# Patient Record
Sex: MALE | URBAN - METROPOLITAN AREA
[De-identification: names, ages, dates, MRNs, and addresses within clinical notes are randomized per-mention and may not be internally consistent; named-entity substitution may affect disease eponyms.]

---

## 2012-02-15 LAB — HBA1C MFR BLD: 7.7 % (ref 4.3–6.1)

## 2012-05-21 LAB — HBA1C MFR BLD: 6.9 % (ref 4.3–6.1)

## 2012-08-27 LAB — HBA1C MFR BLD: 7.7 % (ref 4.3–6.1)

## 2012-10-30 LAB
CHOLEST SERPL-MCNC: 170 MG/DL (ref 0–199)
HBA1C MFR BLD: 7.4 % (ref 4.3–6.1)
HDLC SERPL-MCNC: 58 MG/DL
LDLC SERPL CALC-MCNC: 98 MG/DL (ref 0–129)
NONHDLC SERPL-MCNC: 112 MG/DL
TRIGL SERPL-MCNC: 68 MG/DL (ref 0–149)

## 2013-01-09 LAB — HBA1C MFR BLD: 7.7 % (ref 4.3–6.1)

## 2013-07-09 LAB — HBA1C MFR BLD: 6.7 % (ref 4.3–6.1)

## 2014-11-19 LAB — HBA1C MFR BLD: 7.1 % (ref 4.3–5.6)

## 2015-07-29 LAB — HBA1C MFR BLD: 6.6 % (ref 4.3–5.6)

## 2017-01-06 ENCOUNTER — TRANSFERRED RECORDS (OUTPATIENT)
Dept: HEALTH INFORMATION MANAGEMENT | Facility: CLINIC | Age: 77
End: 2017-01-06

## 2017-01-06 LAB
CHOLEST SERPL-MCNC: 156 MG/DL (ref 0–199)
HBA1C MFR BLD: 8.1 % (ref 4.3–5.6)
HDLC SERPL-MCNC: 56 MG/DL
LDLC SERPL CALC-MCNC: 90 MG/DL (ref 0–129)
NONHDLC SERPL-MCNC: 100 MG/DL
TRIGL SERPL-MCNC: 52 MG/DL (ref 0–149)

## 2017-04-20 ENCOUNTER — TRANSFERRED RECORDS (OUTPATIENT)
Dept: HEALTH INFORMATION MANAGEMENT | Facility: CLINIC | Age: 77
End: 2017-04-20

## 2017-04-20 LAB — HBA1C MFR BLD: 7.4 % (ref 4.3–5.6)

## 2017-04-26 ENCOUNTER — OFFICE VISIT (OUTPATIENT)
Dept: PHARMACY | Facility: CLINIC | Age: 77
End: 2017-04-26
Payer: COMMERCIAL

## 2017-04-26 VITALS
HEART RATE: 55 BPM | DIASTOLIC BLOOD PRESSURE: 68 MMHG | WEIGHT: 176 LBS | SYSTOLIC BLOOD PRESSURE: 137 MMHG | BODY MASS INDEX: 26.76 KG/M2

## 2017-04-26 DIAGNOSIS — E11.9 TYPE 2 DIABETES MELLITUS WITHOUT COMPLICATION, WITHOUT LONG-TERM CURRENT USE OF INSULIN (H): Primary | ICD-10-CM

## 2017-04-26 DIAGNOSIS — B00.9 HSV (HERPES SIMPLEX VIRUS) INFECTION: ICD-10-CM

## 2017-04-26 DIAGNOSIS — N18.30 CHRONIC KIDNEY DISEASE, STAGE III (MODERATE) (H): ICD-10-CM

## 2017-04-26 DIAGNOSIS — N52.9 ERECTILE DYSFUNCTION, UNSPECIFIED ERECTILE DYSFUNCTION TYPE: ICD-10-CM

## 2017-04-26 DIAGNOSIS — I10 ESSENTIAL HYPERTENSION WITH GOAL BLOOD PRESSURE LESS THAN 140/90: ICD-10-CM

## 2017-04-26 DIAGNOSIS — K21.9 GASTROESOPHAGEAL REFLUX DISEASE WITHOUT ESOPHAGITIS: ICD-10-CM

## 2017-04-26 DIAGNOSIS — E78.5 HYPERLIPIDEMIA LDL GOAL <100: ICD-10-CM

## 2017-04-26 PROCEDURE — 99607 MTMS BY PHARM ADDL 15 MIN: CPT | Performed by: PHARMACIST

## 2017-04-26 PROCEDURE — 99605 MTMS BY PHARM NP 15 MIN: CPT | Performed by: PHARMACIST

## 2017-04-26 RX ORDER — CALCITRIOL 0.25 UG/1
0.25 CAPSULE, LIQUID FILLED ORAL
COMMUNITY
Start: 2017-04-12 | End: 2018-04-12

## 2017-04-26 NOTE — PROGRESS NOTES
SUBJECTIVE/OBJECTIVE:                                                    Derrick Dela Cruz is a 76 year old male coming in for a follow-up visit for Medication Therapy Management.  He was referred to me from self.   First visit in 2017    Chief Complaint: Follow up from our visit on 16.  Following up DM2 and HTN.    Personal Healthcare Goals: To exercise more to improve his blood sugars  Tobacco: No tobacco use   Alcohol: Less than 1 beverage / month    Medication Adherence: no issues reported    Diabetes:  Pt currently taking tradjenta 2.5 mg, glipizide 2.5 mg, cinnamon 500 mg capsules.  Pt is not experiencing side effects  SMBG: two times daily.   Ranges (based on glucometer readings):   FB-118  6pm: 120-130  Symptoms of low blood sugar? shaky. Frequency of hypoglycemia? Weekly when he is physically active  Recent symptoms of high blood sugar? none  Eye exam: up to date  Foot exam: no concerns  Microalbumin is not < 30 mg/g.  Pt is taking an ACEi/ARB.  Aspirin: Taking 81mg daily and denies side effects  Diet/Exercise: started to exercise daily     CKD:  Followed by Nephology.  Most recent labs Scr 1.4, GFR 49 which is improved.  Recently starting calcitriol for elevated PTH. No concerns at this time.    Hypertension: Current medications include amlodipine 5mg daily, benazapril 40 mg daily.  Patient does self-monitor BP. Home BP monitoring in range of 120's systolic over 80's diastolic.  Patient reports no current medication side effects.    GERD: Current medications include: Zantac (ranitidine) 75 mg at bedtime and tums as needed. Patient feels that current regimen is effective.    Hyperlipidemia: Currently taking simvastatin 20 mg daily.  No side effects reported.      Cold Sores:  Lysine and valacyclovir for treatment of cold sores.  Avoiding peanuts and chocolate to reduce risk.      Erectile Dysfunction: now taking cialis 5 mg daily.  He is pleased with effectiveness but has to order it from [facility]  due to lack of coverage.      General Pain: Uses APAP 1000 mg as needed prior to exercise and for aches and pains. Feels it is adequate at controlling pain.     Immunizations:  Patient reports having a flu shot this year.  He thinks he might be due for a pneumonia booster.      Current labs include:  BP Readings from Last 3 Encounters:   12/21/16 111/63   08/22/16 123/64   05/02/16 122/69     There were no vitals taken for this visit.    A1C      7.0   6/21/2016.    CHOL      153   1/21/2016  TRIG       60   1/21/2016  HDL       52   1/21/2016  LDL       89   1/21/2016    ASSESSMENT:                                                    Current medications were reviewed today.    Medicare Part D topics discussed:Medications reviewed-no issues identified or changes needed      Medication Adherence: no issues identified    Diabetes: Stable. Patient is meeting A1c goal of < 8%.      CKD: Follow-up with Nephrology as planned    Hypertension: Stable. Patient is meeting BP goal of < 140/90mmHg. No side effects reported.  Patient is due to see nephrology. Continue current therapy.      GERD: Stable. Patient feels symptoms are much improved with use of ranitidine daily and tums as needed.  At appropriate dose for kidney function.  Continue current therapy.      Cold Sores:  Stable    Hyperlipidemia: Stable. Pt is on moderate intensity statin which is indicated based on 2013 ACC/AHA guidelines for lipid management for patients of his approximate age and risk.  Continue current therapy.      Erectile dysfunction: Stable.       PLAN:                                                      1) Continue current regimen    I spent 45 minutes with this patient today.  An additional 15 minutes was spent creating a MAP.  All changes were made via collaborative practice agreement with Unknown, Doctor. A copy of the visit note was provided to the patient's primary care provider.     Will follow up in 5-6 months    The patient was given a  summary of these recommendations as an after visit summary.    Krista Mendoza , Pharm D  [phone] (phone)  [phone] (pager)  Medication Therapy Management Pharmacist

## 2017-04-26 NOTE — MR AVS SNAPSHOT
After Visit Summary   4/26/2017    Derrick Dela Cruz    MRN: [id number]           Patient Information     Date Of Birth          1940        Visit Information        Provider Department      4/26/2017 1:30 PM Krista Mendoza, [facility] MTM        Care Instructions    Recommendations from today's MTM visit:                                                    MTM (medication therapy management) is a service provided by a clinical pharmacist designed to help you get the most of out of your medicines.   Today we reviewed what your medicines are for, how to know if they are working, that your medicines are safe and how to make your medicine regimen as easy as possible.     1. Continue current regimen    2.  Calcium with diet (cheese, sour cream, green leafy veggies) and TUMS      Next MTM visit: 5-6 months    To schedule another MTM appointment, please call the clinic directly or you may call the MTM scheduling line at [phone] or toll-free at [phone].     My Clinical Pharmacist's contact information:                                                      It was a pleasure seeing you today!  Please feel free to contact me with any questions or concerns you have.      Krista Mendoza , Pharm D  [phone] (phone)  [phone] (pager)  Medication Therapy Management Pharmacist     You may receive a survey about the MTM services you received.  I would appreciate your feedback to help me serve you better in the future. Please fill it out and return it when you can. Your comments will be anonymous.      My healthcare goals:                                                      Healthcare Goals for Patients with Diabetes:     Home Monitoring of Blood Sugar:  Morning Pre-meal: 80-130mg/dl  2-Hours After a Meal:  (150)mg/dl       Blood Pressure: Less than 140/90 mm/hg     Hemoglobin A1C: Less than 7.0%      This is a 3 month average of your blood sugars. We should do this test every 3 to  "6 months depending on your diabetes control.      Goals for Cholesterol Levels:  Total Cholesterol: Less than 200 mg/dl  Triglycerides: Less than 150 mg/dl  LDL: Less than 100mg/dl (less than 70 mg/dl with heart disease)  HDL: Above 40 mg/dl (or as high as we can)      We should check your cholesterol and liver function at least every year or 3 months after a change in dose or medication.     By being more aware of your goals for your healthcare, you can take a more active role in managing your medications and lifestyle changes. We all need to work together to help you get the best healthcare.                Follow-ups after your visit        Who to contact     If you have questions or need follow up information about today's clinic visit or your schedule please contact [facility] directly at [phone].  Normal or non-critical lab and imaging results will be communicated to you by [employer], letter or phone within 4 business days after the clinic has received the results. If you do not hear from us within 7 days, please contact the clinic through [employer] or phone. If you have a critical or abnormal lab result, we will notify you by phone as soon as possible.  Submit refill requests through [employer] or call your pharmacy and they will forward the refill request to us. Please allow 3 business days for your refill to be completed.          Additional Information About Your Visit        [employer] Information     [employer] lets you send messages to your doctor, view your test results, renew your prescriptions, schedule appointments and more. To sign up, go to [email or web address] . Click on \"Log in\" on the left side of the screen, which will take you to the Welcome page. Then click on \"Sign up Now\" on the right side of the page.     You will be asked to enter the access code listed below, as well as some personal information. Please follow the directions to create your username and password.     Your access code " is: [email or web address]  Expires: 2017  1:58 PM     Your access code will  in 90 days. If you need help or a new code, please call your [place] clinic or [phone].        Care EveryWhere ID     This is your Care EveryWhere ID. This could be used by other organizations to access your [place] medical records  [id number]        Your Vitals Were     Pulse BMI (Body Mass Index)                55 26.76 kg/m2           Blood Pressure from Last 3 Encounters:   17 137/68   16 111/63   16 123/64    Weight from Last 3 Encounters:   17 176 lb (79.8 kg)   16 172 lb (78 kg)   16 170 lb (77.1 kg)              Today, you had the following     No orders found for display       Primary Care Provider    Doctor Unknown, MD       No address on file        Thank you!     Thank you for choosing [facility]  for your care. Our goal is always to provide you with excellent care. Hearing back from our patients is one way we can continue to improve our services. Please take a few minutes to complete the written survey that you may receive in the mail after your visit with us. Thank you!             Your Updated Medication List - Protect others around you: Learn how to safely use, store and throw away your medicines at www.disposemymeds.org.          This list is accurate as of: 17  1:58 PM.  Always use your most recent med list.                   Brand Name Dispense Instructions for use    acetaminophen 500 MG tablet    TYLENOL     Take 1-2 tablets by mouth as needed.       amLODIPine 5 MG tablet    NORVASC     Take 5 mg by mouth daily       aspirin 81 MG tablet      1 TABLET DAILY       benazepril 40 MG tablet    LOTENSIN     Take 40 mg by mouth daily       calcitRIOL 0.25 MCG capsule    ROCALTROL     Take 0.25 mcg by mouth       cinnamon 500 MG Caps      Take 1 capsule by mouth daily       furosemide 20 MG tablet    LASIX     Take 1 tablet by mouth daily.       glipiZIDE 2.5 MG 24  hr tablet    GLUCOTROL XL     Take 2.5 mg by mouth daily       linagliptin 5 MG Tabs tablet    TRADJENTA     Take 2.5 mg by mouth daily       Lysine 1000 MG Tabs      Take 1,000 mg by mouth as needed (as needed for canker sores)       Melatonin 2.5 MG Caps      Take 1 capsule by mouth daily       ranitidine 150 MG tablet    ZANTAC     Take 75 mg by mouth At Bedtime       simvastatin 20 MG tablet    ZOCOR     Take 20 mg by mouth At Bedtime       tadalafil 5 MG tablet    CIALIS    30 tablet    Take 1 tablet (5 mg) by mouth daily Never use with nitroglycerin, terazosin or doxazosin.       TRUETEST test strip   Generic drug:  blood glucose monitoring      daily       valACYclovir 500 MG tablet    VALTREX    90 tablet    Take 1 tablet (500 mg) by mouth daily

## 2017-04-26 NOTE — PATIENT INSTRUCTIONS
Recommendations from today's MTM visit:                                                    MTM (medication therapy management) is a service provided by a clinical pharmacist designed to help you get the most of out of your medicines.   Today we reviewed what your medicines are for, how to know if they are working, that your medicines are safe and how to make your medicine regimen as easy as possible.     1. Continue current regimen    2.  Calcium with diet (cheese, sour cream, green leafy veggies) and TUMS      Next MTM visit: 5-6 months    To schedule another MTM appointment, please call the clinic directly or you may call the MTM scheduling line at [phone] or toll-free at [phone].     My Clinical Pharmacist's contact information:                                                      It was a pleasure seeing you today!  Please feel free to contact me with any questions or concerns you have.      Krista Mendoza , Pharm D  [phone] (phone)  [phone] (pager)  Medication Therapy Management Pharmacist     You may receive a survey about the MTM services you received.  I would appreciate your feedback to help me serve you better in the future. Please fill it out and return it when you can. Your comments will be anonymous.      My healthcare goals:                                                      Healthcare Goals for Patients with Diabetes:     Home Monitoring of Blood Sugar:  Morning Pre-meal: 80-130mg/dl  2-Hours After a Meal:  (150)mg/dl       Blood Pressure: Less than 140/90 mm/hg     Hemoglobin A1C: Less than 7.0%      This is a 3 month average of your blood sugars. We should do this test every 3 to 6 months depending on your diabetes control.      Goals for Cholesterol Levels:  Total Cholesterol: Less than 200 mg/dl  Triglycerides: Less than 150 mg/dl  LDL: Less than 100mg/dl (less than 70 mg/dl with heart disease)  HDL: Above 40 mg/dl (or as high as we can)      We should check your  cholesterol and liver function at least every year or 3 months after a change in dose or medication.     By being more aware of your goals for your healthcare, you can take a more active role in managing your medications and lifestyle changes. We all need to work together to help you get the best healthcare.

## 2017-04-26 NOTE — LETTER
"     [facility]     Date: 2017    Derrick Dela Cruz  [address]  [facility] [address]    Dear Mr. Dela Cruz,    Thank you for talking with me on 2017 about your health and medications. Medicare s MTM (Medication Therapy Management) program helps you understand your medications and use them safely.      This letter includes an action plan (Medication Action Plan) and medication list (Personal Medication List). The action plan has steps you should take to help you get the best results from your medications. The medication list will help you keep track of your medications and how to use them the right way.       Have your action plan and medication list with you when you talk with your doctors, pharmacists, and other healthcare providers in your care team.     Ask your doctors, pharmacists, and other healthcare providers to update the action plan and medication list at every visit.     Take your medication list with you if you go to the hospital or emergency room.     Give a copy of the action plan and medication list to your family or caregivers.     If you want to talk about this letter or any of the papers with it, please call   [phone].   We look forward to working with you, your doctors, and other healthcare providers to help you stay healthy.     Sincerely,    Krista Mendoza      Enclosed: Medication Action Plan and Personal Medication List    MEDICATION ACTION PLAN FOR Derrick Dela Cruz,  1940     This action plan will help you get the best results from your medications if you:   1. Read \"What we talked about.\"   2. Take the steps listed in the \"What I need to do\" boxes.   3. Fill in \"What I did and when I did it.\"   4. Fill in \"My follow-up plan\" and \"Questions I want to ask.\"     Have this action plan with you when you talk with your doctors, pharmacists, and other healthcare providers in your care team. Share this with your family or caregivers too.  DATE PREPARED: " 2017  What we talked about:  Medication review                                           What I need to do: continue current regimen       What I did and when I did it:                                              My follow-up plan:                 Questions I want to ask:              If you have any questions about your action plan, call Krista Mendoza, Phone: [phone] , Monday-Friday 8-4:30pm.           MEDICATION LIST FOR Derrick Dela CruzSALAS 1940     This medication list was made for you after we talked. We also used information from your doctor's chart.      Use blank rows to add new medications. Then fill in the dates you started using them.    Cross out medications when you no longer use them. Then write the date and why you stopped using them.    Ask your doctors, pharmacists, and other healthcare providers to update this list at every visit. Keep this list up-to-date with:       Prescription medications    Over the counter drugs     Herbals    Vitamins    Minerals      If you go to the hospital or emergency room, take this list with you. Share this with your family or caregivers too.     DATE PREPARED: 2017  Allergies or side effects: Codeine sulfate; Hydrochlorothiazide; and Iodine-131     Medication:  ACETAMINOPHEN 500 MG PO TABS      How I use it:  Take 1-2 tablets by mouth as needed.       Why I use it:  Pain    Prescriber:  Patient Reported      Date I started using it:       Date I stopped using it:         Why I stopped using it:            Medication:  AMLODIPINE BESYLATE 5 MG PO TABS      How I use it:  Take 5 mg by mouth daily      Why I use it:  Blood Pressure    Prescriber:  Patient Reported      Date I started using it:       Date I stopped using it:         Why I stopped using it:            Medication:  ASPIRIN 81 MG PO TABS      How I use it:  1 TABLET DAILY      Why I use it:  Heart Health    Prescriber:  Patient Reported      Date I started using it:       Date I  stopped using it:         Why I stopped using it:            Medication:  BENAZEPRIL HCL 40 MG PO TABS      How I use it:  Take 40 mg by mouth daily      Why I use it:  Blood Pressure    Prescriber:  Patient Reported      Date I started using it:       Date I stopped using it:         Why I stopped using it:            Medication:  CALCITRIOL 0.25 MCG PO CAPS      How I use it:  Take 0.25 mcg by mouth      Why I use it:  Elevated PTH    Prescriber:  Patient Reported      Date I started using it:       Date I stopped using it:         Why I stopped using it:            Medication:  CINNAMON 500 MG PO CAPS      How I use it:  Take 1 capsule by mouth daily      Why I use it:  Blood sugars    Prescriber:  Patient Reported      Date I started using it:       Date I stopped using it:         Why I stopped using it:            Medication:  FUROSEMIDE 20 MG PO TABS      How I use it:  Take 1 tablet by mouth daily.      Why I use it:  Edema    Prescriber:  Patient Reported      Date I started using it:       Date I stopped using it:         Why I stopped using it:            Medication:  GLIPIZIDE ER 2.5 MG PO TB24      How I use it:  Take 2.5 mg by mouth daily      Why I use it:  Diabetes    Prescriber:  Patient Reported      Date I started using it:       Date I stopped using it:         Why I stopped using it:            Medication:  LINAGLIPTIN 5 MG PO TABS      How I use it:  Take 2.5 mg by mouth daily       Why I use it:  Diabetes    Prescriber:  Patient Reported      Date I started using it:       Date I stopped using it:         Why I stopped using it:            Medication:  LYSINE 1000 MG PO TABS      How I use it:  Take 1,000 mg by mouth as needed (as needed for canker sores)      Why I use it:  Cold Sores    Prescriber:  Patient Reported      Date I started using it:       Date I stopped using it:         Why I stopped using it:            Medication:  MELATONIN 2.5 MG PO CAPS      How I use it:  Take 1 capsule  by mouth daily      Why I use it:  Sleep    Prescriber:  Patient Reported      Date I started using it:       Date I stopped using it:         Why I stopped using it:            Medication:  RANITIDINE  MG PO TABS      How I use it:  Take 75 mg by mouth At Bedtime       Why I use it:  Heart Burn    Prescriber:  Patient Reported      Date I started using it:       Date I stopped using it:         Why I stopped using it:            Medication:  SIMVASTATIN 20 MG PO TABS      How I use it:  Take 20 mg by mouth At Bedtime      Why I use it:  Cholesterol    Prescriber:  Patient Reported      Date I started using it:       Date I stopped using it:         Why I stopped using it:            Medication:  TADALAFIL 5 MG PO TABS      How I use it:  Take 1 tablet (5 mg) by mouth daily Never use with nitroglycerin, terazosin or doxazosin.      Why I use it:  Erectile Dysfunction    Prescriber:  Provider Abstract      Date I started using it:       Date I stopped using it:         Why I stopped using it:            Medication:  VALACYCLOVIR  MG PO TABS      How I use it:  Take 1 tablet (500 mg) by mouth daily      Why I use it:  Cold Sores    Prescriber:         Date I started using it:       Date I stopped using it:         Why I stopped using it:            Medication:         How I use it:         Why I use it:      Prescriber:         Date I started using it:       Date I stopped using it:         Why I stopped using it:            Medication:         How I use it:         Why I use it:      Prescriber:         Date I started using it:       Date I stopped using it:         Why I stopped using it:            Medication:         How I use it:         Why I use it:      Prescriber:         Date I started using it:       Date I stopped using it:         Why I stopped using it:              Other Information:     If you have any questions about your action plan, call [phone].    According to the Paperwork  Reduction Act of 1995, no persons are required to respond to a collection of information unless it displays a valid OMB control number. The valid OMB number for this information collection is 8040-2069. The time required to complete this information collection is estimated to average 40 minutes per response, including the time to review instructions, searching existing data resources, gather the data needed, and complete and review the information collection. If you have any comments concerning the accuracy of the time estimate(s) or suggestions for improving this form, please write to: CMS, Attn: [employer] Reports Clearance Officer, [address] [place] [id number].

## 2017-09-25 ENCOUNTER — TELEPHONE (OUTPATIENT)
Dept: PHARMACY | Facility: CLINIC | Age: 77
End: 2017-09-25

## 2017-10-09 ENCOUNTER — OFFICE VISIT (OUTPATIENT)
Dept: PHARMACY | Facility: CLINIC | Age: 77
End: 2017-10-09
Payer: COMMERCIAL

## 2017-10-09 VITALS — WEIGHT: 176 LBS | SYSTOLIC BLOOD PRESSURE: 108 MMHG | DIASTOLIC BLOOD PRESSURE: 60 MMHG | BODY MASS INDEX: 26.76 KG/M2

## 2017-10-09 DIAGNOSIS — G47.00 INSOMNIA, UNSPECIFIED TYPE: ICD-10-CM

## 2017-10-09 DIAGNOSIS — I10 ESSENTIAL HYPERTENSION WITH GOAL BLOOD PRESSURE LESS THAN 140/90: ICD-10-CM

## 2017-10-09 DIAGNOSIS — G47.00 INSOMNIA: ICD-10-CM

## 2017-10-09 DIAGNOSIS — B00.9 HERPES SIMPLEX VIRUS INFECTION: ICD-10-CM

## 2017-10-09 DIAGNOSIS — E78.5 HYPERLIPIDEMIA LDL GOAL <100: ICD-10-CM

## 2017-10-09 DIAGNOSIS — K21.9 GASTROESOPHAGEAL REFLUX DISEASE WITHOUT ESOPHAGITIS: ICD-10-CM

## 2017-10-09 DIAGNOSIS — E11.9 TYPE 2 DIABETES MELLITUS WITHOUT COMPLICATION, WITHOUT LONG-TERM CURRENT USE OF INSULIN (H): Primary | ICD-10-CM

## 2017-10-09 DIAGNOSIS — K21.9 ESOPHAGEAL REFLUX: ICD-10-CM

## 2017-10-09 DIAGNOSIS — N18.30 CHRONIC KIDNEY DISEASE, STAGE III (MODERATE) (H): ICD-10-CM

## 2017-10-09 PROCEDURE — 99607 MTMS BY PHARM ADDL 15 MIN: CPT | Performed by: PHARMACIST

## 2017-10-09 PROCEDURE — 99606 MTMS BY PHARM EST 15 MIN: CPT | Performed by: PHARMACIST

## 2017-10-09 RX ORDER — CALCIUM CARBONATE 500 MG/1
1 TABLET, CHEWABLE ORAL DAILY PRN
COMMUNITY

## 2017-10-09 NOTE — MR AVS SNAPSHOT
After Visit Summary   10/9/2017    Derrick Dela Cruz    MRN: [id number]           Patient Information     Date Of Birth          1940        Visit Information        Provider Department      10/9/2017 9:30 AM Krista Mendoza, [facility]        Care Instructions    Recommendations from today's MTM visit:                                                        1. Maintain drinking adequate water daily.    2. Cramps- increasing water intake and massage/exercise before bed.  If the cramps get worse or more consistent you can talk to your doctors about checking electrolyte labs.      3.  Increase exercise to help with blood sugars.      Next MTM visit: 3 months    To schedule another MTM appointment, please call the clinic directly or you may call the MTM scheduling line at [phone] or toll-free at [phone].     My Clinical Pharmacist's contact information:                                                      It was a pleasure seeing you today!  Please feel free to contact me with any questions or concerns you have.      Krista Mendoza, Seg[place]      You may receive a survey about the MT services you received.  I would appreciate your feedback to help me serve you better in the future. Please fill it out and return it when you can. Your comments will be anonymous.                     Follow-ups after your visit        Who to contact     If you have questions or need follow up information about today's clinic visit or your schedule please contact [facility] directly at [phone].  Normal or non-critical lab and imaging results will be communicated to you by MyChart, letter or phone within 4 business days after the clinic has received the results. If you do not hear from us within 7 days, please contact the clinic through [employer] or phone. If you have a critical or abnormal lab result, we will notify you by phone as soon as possible.  Submit refill requests through [employer]  "or call your pharmacy and they will forward the refill request to us. Please allow 3 business days for your refill to be completed.          Additional Information About Your Visit        MyChart Information     [employer] lets you send messages to your doctor, view your test results, renew your prescriptions, schedule appointments and more. To sign up, go to [email or web address] . Click on \"Log in\" on the left side of the screen, which will take you to the Welcome page. Then click on \"Sign up Now\" on the right side of the page.     You will be asked to enter the access code listed below, as well as some personal information. Please follow the directions to create your username and password.     Your access code is: [email or web address]  Expires: 2018 10:06 AM     Your access code will  in 90 days. If you need help or a new code, please call your [facility] clinic or [phone].        Care EveryWhere ID     This is your Care EveryWhere ID. This could be used by other organizations to access your [facility] medical records  [id number]        Your Vitals Were     BMI (Body Mass Index)                   26.76 kg/m2            Blood Pressure from Last 3 Encounters:   10/09/17 108/60   17 137/68   16 111/63    Weight from Last 3 Encounters:   10/09/17 176 lb (79.8 kg)   17 176 lb (79.8 kg)   16 172 lb (78 kg)              Today, you had the following     No orders found for display       Primary Care Provider    None Specified       No primary provider on file.        Equal Access to Services     GIRISH ALEJANDRO : Hadii fredrick Rowland, waalineda whit, qaybta kaalnichol barnes. So [facility] [phone].    ATENCIÓN: Si habla español, tiene a aguilera disposición servicios gratuitos de asistencia lingüística. Llame al [phone].    We comply with applicable federal civil rights laws and [place] laws. We do not discriminate on the basis of race, color, " national origin, age, disability, sex, sexual orientation, or gender identity.            Thank you!     Thank you for choosing [facility]  for your care. Our goal is always to provide you with excellent care. Hearing back from our patients is one way we can continue to improve our services. Please take a few minutes to complete the written survey that you may receive in the mail after your visit with us. Thank you!             Your Updated Medication List - Protect others around you: Learn how to safely use, store and throw away your medicines at www.disposemymeds.org.          This list is accurate as of: 10/9/17 10:06 AM.  Always use your most recent med list.                   Brand Name Dispense Instructions for use Diagnosis    acetaminophen 500 MG tablet    TYLENOL     Take 1-2 tablets by mouth as needed.        amLODIPine 5 MG tablet    NORVASC     Take 5 mg by mouth daily        aspirin 81 MG tablet      1 TABLET DAILY        benazepril 40 MG tablet    LOTENSIN     Take 40 mg by mouth daily        calcitRIOL 0.25 MCG capsule    ROCALTROL     Take 0.25 mcg by mouth        cinnamon 500 MG Caps      Take 1 capsule by mouth 2 times daily        furosemide 20 MG tablet    LASIX     Take 1 tablet by mouth daily.        glipiZIDE 2.5 MG 24 hr tablet    GLUCOTROL XL     Take 2.5 mg by mouth daily        linagliptin 5 MG Tabs tablet    TRADJENTA     Take 5 mg by mouth daily        Lysine 1000 MG Tabs      Take 1,000 mg by mouth as needed (as needed for canker sores)        Melatonin 2.5 MG Caps      Take 1 capsule by mouth daily        ranitidine 150 MG tablet    ZANTAC     Take 75 mg by mouth At Bedtime        simvastatin 20 MG tablet    ZOCOR     Take 20 mg by mouth At Bedtime        tadalafil 5 MG tablet    CIALIS    30 tablet    Take 1 tablet (5 mg) by mouth daily Never use with nitroglycerin, terazosin or doxazosin.        TRUETEST test strip   Generic drug:  blood glucose monitoring      daily         valACYclovir 500 MG tablet    VALTREX    90 tablet    Take 1 tablet (500 mg) by mouth daily

## 2017-10-09 NOTE — PATIENT INSTRUCTIONS
Recommendations from today's MTM visit:                                                         1. Cramps- increasing water intake and massage/exercise before bed.  If the cramps get worse or more consistent you can talk to your doctors about checking electrolyte labs.       2.  Increase exercise to help with blood sugars     3. Insomnia - Avoid drinking too much water prior to going to bed to avoid having to get up during the night. If not able to go sleep right away, get out of bed and try reading 15- 20 minutes.     4. Discuss with PCP about getting annual flu shot.      Next MTM visit: 3 months    To schedule another MTM appointment, please call the clinic directly or you may call the MTM scheduling line at [phone] or toll-free at [phone].     My Clinical Pharmacist's contact information:                                                      It was a pleasure seeing you today!  Please feel free to contact me with any questions or concerns you have.      Jos Jeffries, Pharmacy Student    Krista Mendoza, PharmD      You may receive a survey about the MTM services you received.  I would appreciate your feedback to help me serve you better in the future. Please fill it out and return it when you can. Your comments will be anonymous.

## 2017-10-09 NOTE — PROGRESS NOTES
SUBJECTIVE/OBJECTIVE:                Derrick Dela Cruz is a 77 year old male coming in for a follow-up visit for Medication Therapy Management.  He was referred to me from Self.       Chief Complaint: Follow up from our visit on 4/26/17.    Personal Healthcare Goals: Exercise more and drink more water daily  Tobacco: No tobacco use    Alcohol: Less than 1 beverage / month    Medication Adherence: no issues reported    Diabetes:  Pt currently taking Glipizide XL 2.5 mg daily, Cinnamon 500 mg BID, Linagliptan 5 mg daily. Pt is not experiencing side effects.  SMBG: two times daily.   Ranges (from patient's glucose log): 100s- 170s  Patient is experiencing hypoglycemia. Frequency of hypoglycemia? Once in the last month (lows 70s). Symptoms of low blood sugar? shaky.   Recent symptoms of high blood sugar? None reported  Eye exam: due  Foot exam: no concern at this time  Microalbumin is not < 30 mg/g. Pt is taking an ACEi/ARB.  Aspirin: Taking 81mg daily and denies side effects  Diet/Exercise: Goes on walks every night with wife. Says he knows he should exercise more. Goes kayaking during the summer when able to.  Planning to get labs done later in October after returning from a trip, Meeting with nephrologist in Oct. Will have labs done prior to appointment.  Due for flu vaccine    Date FBG/ 2hours post Lunch/2hours post Dinner /2hours post    10/9 116 173     10/8 148 160     10/7 109 95     10/6 142 115     10/5 81 142     10/4 105 167              CKD:  Taking calcitriol 0.25 mcg daily.  No concerns with side effects since starting.  Scheduled with Nephrology for follow-up.  No recent labs.      Hypertension: Current medications include Benazepril 40 mg daily, Amlodipine 5 mg daily, Furosemide 20 mg daily.  Patient does self-monitor BP occasionally . Home BP monitoring in range of 120's systolic over 90's diastolic.  Patient reports no current medication side effects.  Pt reports having increasing muscle cramps on RLE  (calf) mostly at night recently.  Pt trying drink more water during the day.     Hyperlipidemia: Current therapy includes simvastatin 20 mg once daily.  Pt reports no significant myalgias or other side effects.     GERD: Current medications include: Zantac (ranitidine) 75 mg daily QHS, Tums 500 mg daily if needed. The patient does not have a history of GI bleed. Patient feels that current regimen is effective.    Insomnia: Pt currently taking Melatonin 2.5 mg daily. Report sometimes due to having to get up to go to the bathroom. Pt working on going to bed earlier, but will stay up until wife goes to sleep later at night around 11 pm. Wears chin strap to help reduce snoring.     Cold sores: Pt currently using Valacyclovir 500 mg and Lysine 1000 mg daily, pt reports no active sores at this time.     Current labs include:BP Readings from Last 3 Encounters:   04/26/17 137/68   12/21/16 111/63   08/22/16 123/64     Today's Vitals: /60  Wt 176 lb (79.8 kg)  BMI 26.76 kg/m2  Lab Results   Component Value Date    A1C 7.4 04/20/2017   .  Lab Results   Component Value Date    CHOL 156 01/06/2017     Lab Results   Component Value Date    TRIG 52 01/06/2017     Lab Results   Component Value Date    HDL 56 01/06/2017     Lab Results   Component Value Date    LDL 90 01/06/2017       Liver Function Studies - No results for input(s): PROTTOTAL, ALBUMIN, BILITOTAL, ALKPHOS, AST, ALT, BILIDIRECT in the last 61358 hours.          ASSESSMENT:              Current medications were reviewed today as discussed above.        Medication Adherence: no issues identified    Diabetes: Stable. Patient is meeting A1c goal of < 8%. Pt blood sugars vary based upon daily exercise no major concerns at this time.   Due for flu vaccine    Hypertension: Stable. Patient is meeting BP goal of < 140/90mmHg.     Hyperlipidemia: Stable. Pt is on moderate intensity statin which is indicated based on 2013 ACC/AHA guidelines for lipid management.       GERD: Stable.  Current treatment is effective.    Insomnia: Needs improvement. Discussed behavioral options of getting up for 15-20 minutes to read and maintaining a normal routine prior to going to sleep.   Pt would benefit from monitoring fluid intake later in the evening to avoid having to get up during the middle of the night.     Cold Sores:  Stable. No active infection.    CKD:  Pt to f/u with Nephrology as planned.  No recommended changes in medication dosing.     PLAN:                  1. Cramps- increasing water intake and massage/exercise before bed.  If the cramps get worse or more consistent you can talk to your doctors about checking electrolyte labs.      2.  Increase exercise to help with blood sugars    3. Insomnia - Avoid drinking too much water prior to going to bed to avoid having to get up during the night. If not able to go sleep right away, get out of bed and try reading 15- 20 minutes.    4. Discuss with PCP about getting annual flu shot. - pt got flu vaccine per [facility]    I spent 30 minutes with this patient today. All changes were made via collaborative practice agreement with No primary care provider on file.. A copy of the visit note was provided to the patient's primary care provider.     Will follow up in 3 months.    The patient was given a summary of these recommendations as an after visit summary.    Jos Jeffries, Pharmacy Student    Krista Mendoza , Pharm D  [phone] (phone)  [phone] (pager)  Medication Therapy Management Pharmacist

## 2017-10-26 LAB — HBA1C MFR BLD: 7.4 % (ref 4.3–5.6)

## 2018-01-15 ENCOUNTER — TELEPHONE (OUTPATIENT)
Dept: PHARMACY | Facility: CLINIC | Age: 78
End: 2018-01-15

## 2018-01-18 ENCOUNTER — OFFICE VISIT (OUTPATIENT)
Dept: PHARMACY | Facility: CLINIC | Age: 78
End: 2018-01-18
Payer: COMMERCIAL

## 2018-01-18 VITALS
BODY MASS INDEX: 26.3 KG/M2 | HEART RATE: 64 BPM | DIASTOLIC BLOOD PRESSURE: 68 MMHG | WEIGHT: 173 LBS | SYSTOLIC BLOOD PRESSURE: 116 MMHG

## 2018-01-18 DIAGNOSIS — R07.81 RIB PAIN: ICD-10-CM

## 2018-01-18 DIAGNOSIS — E78.5 HYPERLIPIDEMIA LDL GOAL <100: ICD-10-CM

## 2018-01-18 DIAGNOSIS — B00.9 HERPES SIMPLEX VIRUS INFECTION: ICD-10-CM

## 2018-01-18 DIAGNOSIS — N52.9 ERECTILE DYSFUNCTION, UNSPECIFIED ERECTILE DYSFUNCTION TYPE: ICD-10-CM

## 2018-01-18 DIAGNOSIS — K21.9 GASTROESOPHAGEAL REFLUX DISEASE WITHOUT ESOPHAGITIS: ICD-10-CM

## 2018-01-18 DIAGNOSIS — E11.9 TYPE 2 DIABETES MELLITUS WITHOUT COMPLICATION, WITHOUT LONG-TERM CURRENT USE OF INSULIN (H): Primary | ICD-10-CM

## 2018-01-18 DIAGNOSIS — G47.00 INSOMNIA, UNSPECIFIED TYPE: ICD-10-CM

## 2018-01-18 DIAGNOSIS — N18.30 CHRONIC KIDNEY DISEASE, STAGE III (MODERATE) (H): ICD-10-CM

## 2018-01-18 DIAGNOSIS — I10 ESSENTIAL HYPERTENSION WITH GOAL BLOOD PRESSURE LESS THAN 140/90: ICD-10-CM

## 2018-01-18 PROCEDURE — 99607 MTMS BY PHARM ADDL 15 MIN: CPT | Performed by: PHARMACIST

## 2018-01-18 PROCEDURE — 99605 MTMS BY PHARM NP 15 MIN: CPT | Performed by: PHARMACIST

## 2018-01-18 RX ORDER — SODIUM BICARBONATE 325 MG/1
325 TABLET ORAL 2 TIMES DAILY
COMMUNITY

## 2018-01-18 RX ORDER — ATORVASTATIN CALCIUM 10 MG/1
10 TABLET, FILM COATED ORAL DAILY
COMMUNITY

## 2018-01-18 NOTE — LETTER
"     [facility]     Date: 2018    Derrick Dela Cruz  [address]  [facility] [address]    Dear Mr. Dela Cruz,    Thank you for talking with me on 18 about your health and medications. Medicare s MTM (Medication Therapy Management) program helps you understand your medications and use them safely.      This letter includes an action plan (Medication Action Plan) and medication list (Personal Medication List). The action plan has steps you should take to help you get the best results from your medications. The medication list will help you keep track of your medications and how to use them the right way.       Have your action plan and medication list with you when you talk with your doctors, pharmacists, and other healthcare providers in your care team.     Ask your doctors, pharmacists, and other healthcare providers to update the action plan and medication list at every visit.     Take your medication list with you if you go to the hospital or emergency room.     Give a copy of the action plan and medication list to your family or caregivers.     If you want to talk about this letter or any of the papers with it, please call   [phone].   We look forward to working with you, your doctors, and other healthcare providers to help you stay healthy.     Sincerely,    Krista Mendoza      Enclosed: Medication Action Plan and Personal Medication List    MEDICATION ACTION PLAN FOR Derrick Dela Cruz,  1940     This action plan will help you get the best results from your medications if you:   1. Read \"What we talked about.\"   2. Take the steps listed in the \"What I need to do\" boxes.   3. Fill in \"What I did and when I did it.\"   4. Fill in \"My follow-up plan\" and \"Questions I want to ask.\"     Have this action plan with you when you talk with your doctors, pharmacists, and other healthcare providers in your care team. Share this with your family or caregivers too.  DATE PREPARED: " 2018  What we talked about: taking both glipizide tablets before breakfast                                                 What I need to do: take both glipizide tablets before breakfast       What I did and when I did it:                                              My follow-up plan:                 Questions I want to ask:              If you have any questions about your action plan, call Krista Mendoza, Phone: [phone] , Monday-Friday 8-4:30pm.           MEDICATION LIST FOR Derrick Dela CruzSALAS 1940     This medication list was made for you after we talked. We also used information from your doctor's chart.      Use blank rows to add new medications. Then fill in the dates you started using them.    Cross out medications when you no longer use them. Then write the date and why you stopped using them.    Ask your doctors, pharmacists, and other healthcare providers to update this list at every visit. Keep this list up-to-date with:       Prescription medications    Over the counter drugs     Herbals    Vitamins    Minerals      If you go to the hospital or emergency room, take this list with you. Share this with your family or caregivers too.     DATE PREPARED: 2018  Allergies or side effects: Codeine sulfate; Hydrochlorothiazide; and Iodine-131     Medication:  ACETAMINOPHEN 500 MG PO TABS      How I use it:  Take 1-2 tablets by mouth as needed.       Why I use it:  Pain    Prescriber:  Patient Reported      Date I started using it:       Date I stopped using it:         Why I stopped using it:            Medication:  AMLODIPINE BESYLATE 5 MG PO TABS      How I use it:  Take 5 mg by mouth daily      Why I use it:  Blood Pressure    Prescriber:  Patient Reported      Date I started using it:       Date I stopped using it:         Why I stopped using it:            Medication:  ASPIRIN 81 MG PO TABS      How I use it:  1 TABLET DAILY      Why I use it:  Heart Health    Prescriber:  Patient  Reported      Date I started using it:       Date I stopped using it:         Why I stopped using it:            Medication:  ATORVASTATIN CALCIUM 10 MG PO TABS      How I use it:  Take 10 mg by mouth daily      Why I use it:  Cholesterol    Prescriber:  Patient Reported      Date I started using it:       Date I stopped using it:         Why I stopped using it:            Medication:  BENAZEPRIL HCL 40 MG PO TABS      How I use it:  Take 40 mg by mouth daily      Why I use it:  Blood Pressure    Prescriber:  Patient Reported      Date I started using it:       Date I stopped using it:         Why I stopped using it:            Medication:  CALCITRIOL 0.25 MCG PO CAPS      How I use it:  Take 0.25 mcg by mouth      Why I use it:  Kidney    Prescriber:  Patient Reported      Date I started using it:       Date I stopped using it:         Why I stopped using it:            Medication:  CALCIUM CARBONATE ANTACID 500 MG PO CHEW      How I use it:  Take 1 chew tab by mouth daily as needed for heartburn      Why I use it:  Heartburn    Prescriber:  Patient Reported      Date I started using it:       Date I stopped using it:         Why I stopped using it:            Medication:  CINNAMON 500 MG PO CAPS      How I use it:  Take 1 capsule by mouth 2 times daily       Why I use it:  Blood Sugars    Prescriber:  Patient Reported      Date I started using it:       Date I stopped using it:         Why I stopped using it:            Medication:  FUROSEMIDE 20 MG PO TABS      How I use it:  Take 1 tablet by mouth daily.      Why I use it:  Swelling    Prescriber:  Patient Reported      Date I started using it:       Date I stopped using it:         Why I stopped using it:            Medication:  GLIPIZIDE ER 2.5 MG PO TB24      How I use it:  Take 5 mg by mouth daily       Why I use it:  Blood Sugars    Prescriber:  Patient Reported      Date I started using it:       Date I stopped using it:         Why I stopped using it:             Medication:  LINAGLIPTIN 5 MG PO TABS      How I use it:  Take 5 mg by mouth daily       Why I use it:  Blood Sugars    Prescriber:  Patient Reported      Date I started using it:       Date I stopped using it:         Why I stopped using it:            Medication:  LYSINE 1000 MG PO TABS      How I use it:  Take 1,000 mg by mouth as needed (as needed for canker sores)      Why I use it:  Cold Sores    Prescriber:  Patient Reported      Date I started using it:       Date I stopped using it:         Why I stopped using it:            Medication:  MELATONIN 2.5 MG PO CAPS      How I use it:  Take 1 capsule by mouth daily      Why I use it:      Prescriber:  Patient Reported      Date I started using it:       Date I stopped using it:         Why I stopped using it:            Medication:  RANITIDINE  MG PO TABS      How I use it:  Take 75 mg by mouth At Bedtime       Why I use it:      Prescriber:  Patient Reported      Date I started using it:       Date I stopped using it:         Why I stopped using it:            Medication:  SODIUM BICARBONATE 325 MG PO TABS      How I use it:  Take 325 mg by mouth 2 times daily      Why I use it:  Kidney    Prescriber:  Patient Reported      Date I started using it:       Date I stopped using it:         Why I stopped using it:            Medication:  TADALAFIL 5 MG PO TABS      How I use it:  Take 1 tablet (5 mg) by mouth daily Never use with nitroglycerin, terazosin or doxazosin.      Why I use it:  Erectile Dysfunction    Prescriber:  Provider Abstract      Date I started using it:       Date I stopped using it:         Why I stopped using it:            Medication:  TRUETEST TEST VI STRP      How I use it:  daily      Why I use it:  Diabetes    Prescriber:  Patient Reported      Date I started using it:       Date I stopped using it:         Why I stopped using it:            Medication:  VALACYCLOVIR  MG PO TABS      How I use it:  Take 1 tablet (500  mg) by mouth daily      Why I use it:  Herpes    Prescriber:         Date I started using it:       Date I stopped using it:         Why I stopped using it:            Medication:         How I use it:         Why I use it:      Prescriber:         Date I started using it:       Date I stopped using it:         Why I stopped using it:            Medication:         How I use it:         Why I use it:      Prescriber:         Date I started using it:       Date I stopped using it:         Why I stopped using it:            Medication:         How I use it:         Why I use it:      Prescriber:         Date I started using it:       Date I stopped using it:         Why I stopped using it:              Other Information:     If you have any questions about your action plan, call [phone].    According to the Paperwork Reduction Act of 1995, no persons are required to respond to a collection of information unless it displays a valid OMB control number. The valid OMB number for this information collection is 1898-8698. The time required to complete this information collection is estimated to average 40 minutes per response, including the time to review instructions, searching existing data resources, gather the data needed, and complete and review the information collection. If you have any comments concerning the accuracy of the time estimate(s) or suggestions for improving this form, please write to: CMS, Attn: [employer] Reports Clearance Officer, [address] [place] [address], [place], [place] [address].

## 2018-01-18 NOTE — MR AVS SNAPSHOT
After Visit Summary   1/18/2018    Derrick Dela Cruz    MRN: [id number]           Patient Information     Date Of Birth          1940        Visit Information        Provider Department      1/18/2018 9:30 AM Krista Mendoza, [facility]        Care Instructions    Recommendations from today's MT visit:                                                      1. Continue with current medication regimen.  If you get more than 2 blood sugars readings <80 in 1 week then call your doctor to discuss changes in your medications      Next MTM visit: 3 months    To schedule another MTM appointment, please call the clinic directly or you may call the MTM scheduling line at [phone] or toll-free at [phone].     My Clinical Pharmacist's contact information:                                                      It was a pleasure seeing you today!  Please feel free to contact me with any questions or concerns you have.      Krista Mendoza , Pharm D  [phone] (phone)  [phone] (pager)  Medication Therapy Management Pharmacist     You may receive a survey about the [facility] services you received.  I would appreciate your feedback to help me serve you better in the future. Please fill it out and return it when you can. Your comments will be anonymous.                      Follow-ups after your visit        Who to contact     If you have questions or need follow up information about today's clinic visit or your schedule please contact [facility] directly at [phone].  Normal or non-critical lab and imaging results will be communicated to you by MyChart, letter or phone within 4 business days after the clinic has received the results. If you do not hear from us within 7 days, please contact the clinic through MyChart or phone. If you have a critical or abnormal lab result, we will notify you by phone as soon as possible.  Submit refill requests through Tip or Skipt or call your pharmacy  "and they will forward the refill request to us. Please allow 3 business days for your refill to be completed.          Additional Information About Your Visit        MyChart Information     [employer] lets you send messages to your doctor, view your test results, renew your prescriptions, schedule appointments and more. To sign up, go to [email or web address] . Click on \"Log in\" on the left side of the screen, which will take you to the Welcome page. Then click on \"Sign up Now\" on the right side of the page.     You will be asked to enter the access code listed below, as well as some personal information. Please follow the directions to create your username and password.     Your access code is: [email or web address]  Expires: 2018 10:05 AM     Your access code will  in 90 days. If you need help or a new code, please call your [place] clinic or [phone].        Care EveryWhere ID     This is your Care EveryWhere ID. This could be used by other organizations to access your [place] medical records  [id number]        Your Vitals Were     Pulse BMI (Body Mass Index)                64 26.3 kg/m2           Blood Pressure from Last 3 Encounters:   18 116/68   10/09/17 108/60   17 137/68    Weight from Last 3 Encounters:   18 173 lb (78.5 kg)   10/09/17 176 lb (79.8 kg)   17 176 lb (79.8 kg)              Today, you had the following     No orders found for display         Today's Medication Changes          These changes are accurate as of: 18 10:05 AM.  If you have any questions, ask your nurse or doctor.               Stop taking these medicines if you haven't already. Please contact your care team if you have questions.     simvastatin 20 MG tablet   Commonly known as:  ZOCOR   Stopped by:  Krista Mendoza, [facility]                    Primary Care Provider Office Phone # Fax #    Yin Jaramillo [phone]       [facility] [address] [place] [address]   "      Equal Access to Services     [facility] : Hadii aad ku hadtabathashay Aimeeali, waalineda luqadaha, qaybta kacelianichol edmond. So [facility] [phone].    ATENCIÓN: Si habla español, tiene a aguilera disposición servicios gratuitos de asistencia lingüística. Llame al [phone].    We comply with applicable federal civil rights laws and [place] laws. We do not discriminate on the basis of race, color, national origin, age, disability, sex, sexual orientation, or gender identity.            Thank you!     Thank you for choosing [facility] [place]note 2]  for your care. Our goal is always to provide you with excellent care. Hearing back from our patients is one way we can continue to improve our services. Please take a few minutes to complete the written survey that you may receive in the mail after your visit with us. Thank you!             Your Updated Medication List - Protect others around you: Learn how to safely use, store and throw away your medicines at www.disposemymeds.org.          This list is accurate as of: 1/18/18 10:05 AM.  Always use your most recent med list.                   Brand Name Dispense Instructions for use Diagnosis    acetaminophen 500 MG tablet    TYLENOL     Take 1-2 tablets by mouth as needed.        amLODIPine 5 MG tablet    NORVASC     Take 5 mg by mouth daily        aspirin 81 MG tablet      1 TABLET DAILY        atorvastatin 10 MG tablet    LIPITOR     Take 10 mg by mouth daily        benazepril 40 MG tablet    LOTENSIN     Take 40 mg by mouth daily        calcitRIOL 0.25 MCG capsule    ROCALTROL     Take 0.25 mcg by mouth        calcium carbonate 500 MG chewable tablet    TUMS     Take 1 chew tab by mouth daily as needed for heartburn        cinnamon 500 MG Caps      Take 1 capsule by mouth 2 times daily        furosemide 20 MG tablet    LASIX     Take 1 tablet by mouth daily.        glipiZIDE 2.5 MG 24 hr tablet    GLUCOTROL XL     Take 5 mg by mouth  daily        linagliptin 5 MG Tabs tablet    TRADJENTA     Take 5 mg by mouth daily        Lysine 1000 MG Tabs      Take 1,000 mg by mouth as needed (as needed for canker sores)        Melatonin 2.5 MG Caps      Take 1 capsule by mouth daily        ranitidine 150 MG tablet    ZANTAC     Take 75 mg by mouth At Bedtime        sodium bicarbonate 325 MG tablet      Take 325 mg by mouth 2 times daily        tadalafil 5 MG tablet    CIALIS    30 tablet    Take 1 tablet (5 mg) by mouth daily Never use with nitroglycerin, terazosin or doxazosin.        TRUETEST test strip   Generic drug:  blood glucose monitoring      daily        valACYclovir 500 MG tablet    VALTREX    90 tablet    Take 1 tablet (500 mg) by mouth daily

## 2018-01-18 NOTE — PATIENT INSTRUCTIONS
Recommendations from today's MTM visit:                                                      1. Continue with current medication regimen.  If you get more than 2 blood sugars readings <80 in 1 week then call your doctor to discuss changes in your medications      Next MTM visit: 3 months    To schedule another MTM appointment, please call the clinic directly or you may call the MTM scheduling line at [phone] or toll-free at [phone].     My Clinical Pharmacist's contact information:                                                      It was a pleasure seeing you today!  Please feel free to contact me with any questions or concerns you have.      Krista Mendoza , Pharm D  [phone] (phone)  [phone] (pager)  Medication Therapy Management Pharmacist     You may receive a survey about the MTM services you received.  I would appreciate your feedback to help me serve you better in the future. Please fill it out and return it when you can. Your comments will be anonymous.

## 2018-01-18 NOTE — PROGRESS NOTES
SUBJECTIVE/OBJECTIVE:                Derrick Dela Cruz is a 77 year old male coming in for a follow-up visit for Medication Therapy Management.  He was referred to me from Self.   First visit in 2018    Chief Complaint: Follow up from our visit on 10/9/17    Personal Healthcare Goals: Exercise more and drink more water daily  Tobacco: No tobacco use    Alcohol: Less than 1 beverage / month    Medication Adherence: no issues reported    Diabetes:  Pt currently taking Glipizide XL 2.5 mg BID, Cinnamon 500 mg BID, Linagliptan 5 mg daily. Pt is not experiencing side effects.  SMBG: two times daily.   Ranges (from patient's glucose log): see below  Patient is experiencing hypoglycemia. Frequency of hypoglycemia? 1-2 in the last month (lows 70s). Symptoms of low blood sugar? shaky.   Recent symptoms of high blood sugar? None reported  Eye exam: scheduled  Foot exam: no concern at this time  Microalbumin is not < 30 mg/g. Pt is taking an ACEi/ARB.  Aspirin: Taking 81mg daily and denies side effects  Diet/Exercise: very active, walking and exercising 1-2 hours per day  Up to date on vaccines    Date FBG/ 2hours post Lunch/2hours post Dinner /2hours post    1/6 115  93    1/7 137  159    1/8 237  175    1/9 114  202    1/10 143  174    1/11 113  74    1/12 113      1/13 156      1/14 118      1/15 123      1/16 121  133    1/17 96  152    1/18 104                Hypertension: Current medications include Benazepril 40 mg daily, Amlodipine 5 mg daily, Furosemide 20 mg daily.  Patient does self-monitor BP occasionally . Home BP monitoring in range of 120's systolic over 90's diastolic.  Patient reports no current medication side effects.     Hyperlipidemia: Current therapy includes atorvastatin 10 mg once daily; changed from simvastatin due to muscle pains.  Continues to have hip pain when sitting.      GERD: Current medications include: Zantac (ranitidine) 75 mg daily with dinner, Tums 500 mg daily if needed. The patient does  not have a history of GI bleed. Patient feels that current regimen is effective.    Insomnia: Pt currently taking Melatonin 2.5 mg daily. Effective for him.  No concerns at this time.    Cold sores/Herpes: Pt currently using Valacyclovir 500 mg and Lysine 1000 mg daily, pt reports no active sores at this time. Has been questioned about dc daily antiviral.  He recalls stopping in the past and he did have flare.    ED:  Taking Cialis 5 mg daily.  Purchases from [employer].  No concerns with side effects.  Feels this has been effective.    Pain:  Fell in Dec on an snowy hill and broke two ribs.  Using APAP as needed.  No concerns at this time.    CKD:  Taking calcitriol 0.25 mcg daily and sodium bicarbonate 325 mg BID.   Labs will be rechecked in lab Feb.  Followed by Neurology.          Current labs include:  BP Readings from Last 3 Encounters:   10/09/17 108/60   04/26/17 137/68   12/21/16 111/63     Today's Vitals: There were no vitals taken for this visit.  Lab Results   Component Value Date    A1C 7.4 04/20/2017   .  Lab Results   Component Value Date    CHOL 156 01/06/2017     Lab Results   Component Value Date    TRIG 52 01/06/2017     Lab Results   Component Value Date    HDL 56 01/06/2017     Lab Results   Component Value Date    LDL 90 01/06/2017       Liver Function Studies - No results for input(s): PROTTOTAL, ALBUMIN, BILITOTAL, ALKPHOS, AST, ALT, BILIDIRECT in the last 95240 hours.          ASSESSMENT:              Current medications were reviewed today as discussed above.    Medicare Part D topics discussed:Timing of medication    Medication Adherence: no issues identified    Diabetes:  A1c at goal <8%.  He will continue current regimen, can dose both glipizide QAM for convenience.  Will report lows if frequency should increase.    Hypertension: stable    Hyperlipidemia: stable    GERD: stable    Insomnia: stable    Cold sores/Herpes: Reviewed risk vs benefit of staying on daily Valtrex; he would prefer to  continue.    ED:  stable    Pain:  stable    CKD:  Scr slightly improved with last labs.  He has upcoming labs scheduled.  Follows with Nephrology.     PLAN:                  1. Continue current regimen  2.  Okay to take both glipizide XL in the morning    I spent 30 minutes with this patient today. An additional 15 minutes was spent completing MAP today. A copy of the visit note was provided to the patient's primary care provider.     Will follow up in 3 months.    The patient was given a summary of these recommendations as an after visit summary.    Krista Mendoza , Pharm D  [phone] (phone)  [phone] (pager)  Medication Therapy Management Pharmacist

## 2018-04-16 ENCOUNTER — TELEPHONE (OUTPATIENT)
Dept: PHARMACY | Facility: CLINIC | Age: 78
End: 2018-04-16

## 2018-04-16 NOTE — TELEPHONE ENCOUNTER
Called pt to schedule MTM f/u appt - patient did not answer. Left Vm and CB# for scheduling.     Mis Islas, PharmD  Pharmaceutical Care Resident   Pager: [phone]

## 2018-04-26 ENCOUNTER — TRANSFERRED RECORDS (OUTPATIENT)
Dept: HEALTH INFORMATION MANAGEMENT | Facility: CLINIC | Age: 78
End: 2018-04-26

## 2018-04-26 LAB
CHOLEST SERPL-MCNC: 159 MG/DL (ref 0–199)
HBA1C MFR BLD: 8.3 % (ref 4.3–5.6)
HDLC SERPL-MCNC: 55 MG/DL
LDLC SERPL CALC-MCNC: 93 MG/DL (ref 0–129)
NONHDLC SERPL-MCNC: 104 MG/DL (ref 0–159)
TRIGL SERPL-MCNC: 57 MG/DL (ref 0–149)

## 2018-05-08 ENCOUNTER — OFFICE VISIT (OUTPATIENT)
Dept: PHARMACY | Facility: CLINIC | Age: 78
End: 2018-05-08
Payer: COMMERCIAL

## 2018-05-08 VITALS
BODY MASS INDEX: 26.75 KG/M2 | DIASTOLIC BLOOD PRESSURE: 67 MMHG | SYSTOLIC BLOOD PRESSURE: 123 MMHG | HEART RATE: 57 BPM | WEIGHT: 175.9 LBS

## 2018-05-08 DIAGNOSIS — I10 ESSENTIAL HYPERTENSION WITH GOAL BLOOD PRESSURE LESS THAN 140/90: ICD-10-CM

## 2018-05-08 DIAGNOSIS — N18.30 CHRONIC KIDNEY DISEASE, STAGE III (MODERATE) (H): ICD-10-CM

## 2018-05-08 DIAGNOSIS — E78.5 HYPERLIPIDEMIA LDL GOAL <100: ICD-10-CM

## 2018-05-08 DIAGNOSIS — R07.81 RIB PAIN: ICD-10-CM

## 2018-05-08 DIAGNOSIS — E11.9 TYPE 2 DIABETES MELLITUS WITHOUT COMPLICATION, WITHOUT LONG-TERM CURRENT USE OF INSULIN (H): Primary | ICD-10-CM

## 2018-05-08 PROCEDURE — 99606 MTMS BY PHARM EST 15 MIN: CPT | Performed by: PHARMACIST

## 2018-05-08 PROCEDURE — 99607 MTMS BY PHARM ADDL 15 MIN: CPT | Performed by: PHARMACIST
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
## 2018-05-08 NOTE — MR AVS SNAPSHOT
After Visit Summary   5/8/2018    Derrick Dela Cruz    MRN: [id number]           Patient Information     Date Of Birth          1940        Visit Information        Provider Department      5/8/2018 2:30 PM Krista Mendoza, [facility]        Care Instructions    Recommendations from today's MT visit:                                                      1. Check blood sugar 2-hrs after meal and bring readings to your doctor visit next week.  Your blood sugar goal 2-hours after should be <180 mg/dl.    Next MT visit: 3 months    To schedule another [facility] appointment, please call the clinic directly or you may call the [facility] scheduling line at [phone] or toll-free at [phone].     My Clinical Pharmacist's contact information:                                                      It was a pleasure seeing you today!  Please feel free to contact me with any questions or concerns you have.      Krista Mendoza , Pharm D  [phone] (phone)  [phone] (pager)  Medication Therapy Management Pharmacist                   Follow-ups after your visit        Who to contact     If you have questions or need follow up information about today's clinic visit or your schedule please contact [facility] directly at [phone].  Normal or non-critical lab and imaging results will be communicated to you by MyChart, letter or phone within 4 business days after the clinic has received the results. If you do not hear from us within 7 days, please contact the clinic through MyChart or phone. If you have a critical or abnormal lab result, we will notify you by phone as soon as possible.  Submit refill requests through [employer] or call your pharmacy and they will forward the refill request to us. Please allow 3 business days for your refill to be completed.          Additional Information About Your Visit        [employer] Information     [employer] lets you send messages to your doctor, view your  "test results, renew your prescriptions, schedule appointments and more. To sign up, go to [email or web address] . Click on \"Log in\" on the left side of the screen, which will take you to the Welcome page. Then click on \"Sign up Now\" on the right side of the page.     You will be asked to enter the access code listed below, as well as some personal information. Please follow the directions to create your username and password.     Your access code is: [email or web address]  Expires: 2018  3:03 PM     Your access code will  in 90 days. If you need help or a new code, please call your [place] clinic or [phone].        Care EveryWhere ID     This is your Care EveryWhere ID. This could be used by other organizations to access your [place] medical records  [id number]        Your Vitals Were     Pulse                   57            Blood Pressure from Last 3 Encounters:   18 123/67   18 116/68   10/09/17 108/60    Weight from Last 3 Encounters:   18 173 lb (78.5 kg)   10/09/17 176 lb (79.8 kg)   17 176 lb (79.8 kg)              Today, you had the following     No orders found for display       Primary Care Provider Office Phone # Fax #    Yin Jaramillo [phone]       [facility] [address] [place] [address]        Equal Access to Services     GIRISH ALEJANDRO AH: Hadii aad ku hadasho Soomaali, waaxda luqadaha, qaybta kaalmada adeegyada, nichol dan. So [facility] [phone].    ATENCIÓN: Si habla español, tiene a aguilera disposición servicios gratuitos de asistencia lingüística. Llame al [phone].    We comply with applicable federal civil rights laws and [place] laws. We do not discriminate on the basis of race, color, national origin, age, disability, sex, sexual orientation, or gender identity.            Thank you!     Thank you for choosing [facility]  for your care. Our goal is always to provide you with excellent " care. Hearing back from our patients is one way we can continue to improve our services. Please take a few minutes to complete the written survey that you may receive in the mail after your visit with us. Thank you!             Your Updated Medication List - Protect others around you: Learn how to safely use, store and throw away your medicines at www.disposemymeds.org.          This list is accurate as of 5/8/18  3:03 PM.  Always use your most recent med list.                   Brand Name Dispense Instructions for use Diagnosis    acetaminophen 500 MG tablet    TYLENOL     Take 1-2 tablets by mouth as needed.        amLODIPine 5 MG tablet    NORVASC     Take 5 mg by mouth daily        aspirin 81 MG tablet      1 TABLET DAILY        atorvastatin 10 MG tablet    LIPITOR     Take 10 mg by mouth daily        benazepril 40 MG tablet    LOTENSIN     Take 40 mg by mouth daily        calcitRIOL 0.25 MCG capsule    ROCALTROL     Take 0.25 mcg by mouth        calcium carbonate 500 MG chewable tablet    TUMS     Take 1 chew tab by mouth daily as needed for heartburn        cinnamon 500 MG Caps      Take 1 capsule by mouth 2 times daily        furosemide 20 MG tablet    LASIX     Take 1 tablet by mouth daily.        glipiZIDE 2.5 MG 24 hr tablet    GLUCOTROL XL     Take 5 mg by mouth daily        linagliptin 5 MG Tabs tablet    TRADJENTA     Take 5 mg by mouth daily        Lysine 1000 MG Tabs      Take 1,000 mg by mouth as needed (as needed for canker sores)        Melatonin 2.5 MG Caps      Take 1 capsule by mouth daily        ranitidine 150 MG tablet    ZANTAC     Take 75 mg by mouth At Bedtime        sodium bicarbonate 325 MG tablet      Take 325 mg by mouth 2 times daily        tadalafil 5 MG tablet    CIALIS    30 tablet    Take 1 tablet (5 mg) by mouth daily Never use with nitroglycerin, terazosin or doxazosin.        TRUETEST test strip   Generic drug:  blood glucose monitoring      daily        valACYclovir 500 MG  tablet    VALTREX    90 tablet    Take 1 tablet (500 mg) by mouth daily

## 2018-05-08 NOTE — PATIENT INSTRUCTIONS
Recommendations from today's MTM visit:                                                      1. Check blood sugar 2-hrs after meal and bring readings to your doctor visit next week.  Your blood sugar goal 2-hours after should be <180 mg/dl.    Next MTM visit: 3 months    To schedule another MTM appointment, please call the clinic directly or you may call the MTM scheduling line at [phone] or toll-free at [phone].     My Clinical Pharmacist's contact information:                                                      It was a pleasure seeing you today!  Please feel free to contact me with any questions or concerns you have.      Krista Mendoza , Pharm D  [phone] (phone)  [phone] (pager)  Medication Therapy Management Pharmacist

## 2018-08-02 ENCOUNTER — TELEPHONE (OUTPATIENT)
Dept: PHARMACY | Facility: CLINIC | Age: 78
End: 2018-08-02

## 2018-08-06 ENCOUNTER — OFFICE VISIT (OUTPATIENT)
Dept: PHARMACY | Facility: CLINIC | Age: 78
End: 2018-08-06
Payer: COMMERCIAL

## 2018-08-06 VITALS
BODY MASS INDEX: 25.62 KG/M2 | SYSTOLIC BLOOD PRESSURE: 123 MMHG | DIASTOLIC BLOOD PRESSURE: 72 MMHG | WEIGHT: 168.5 LBS | HEART RATE: 66 BPM

## 2018-08-06 DIAGNOSIS — L98.9 SKIN LESION: ICD-10-CM

## 2018-08-06 DIAGNOSIS — N18.30 CHRONIC KIDNEY DISEASE, STAGE III (MODERATE) (H): ICD-10-CM

## 2018-08-06 DIAGNOSIS — K21.9 GASTROESOPHAGEAL REFLUX DISEASE WITHOUT ESOPHAGITIS: ICD-10-CM

## 2018-08-06 DIAGNOSIS — I10 ESSENTIAL HYPERTENSION WITH GOAL BLOOD PRESSURE LESS THAN 140/90: ICD-10-CM

## 2018-08-06 DIAGNOSIS — G47.00 INSOMNIA, UNSPECIFIED TYPE: Primary | ICD-10-CM

## 2018-08-06 DIAGNOSIS — E78.5 HYPERLIPIDEMIA LDL GOAL <100: ICD-10-CM

## 2018-08-06 DIAGNOSIS — N52.9 ERECTILE DYSFUNCTION, UNSPECIFIED ERECTILE DYSFUNCTION TYPE: ICD-10-CM

## 2018-08-06 DIAGNOSIS — E11.9 TYPE 2 DIABETES MELLITUS WITHOUT COMPLICATION, WITHOUT LONG-TERM CURRENT USE OF INSULIN (H): ICD-10-CM

## 2018-08-06 DIAGNOSIS — M25.551 HIP PAIN, RIGHT: ICD-10-CM

## 2018-08-06 PROCEDURE — 99607 MTMS BY PHARM ADDL 15 MIN: CPT | Performed by: PHARMACIST

## 2018-08-06 PROCEDURE — 99606 MTMS BY PHARM EST 15 MIN: CPT | Performed by: PHARMACIST

## 2018-08-06 RX ORDER — CALCITRIOL 0.25 UG/1
0.25 CAPSULE, LIQUID FILLED ORAL DAILY
COMMUNITY
Start: 2018-07-20
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
## 2018-08-06 NOTE — PATIENT INSTRUCTIONS
Recommendations from today's MTM visit:                                                      1. Due for A1c lab      Next MTM visit: 3 months    To schedule another MTM appointment, please call the clinic directly or you may call the MTM scheduling line at [phone] or toll-free at [phone].     My Clinical Pharmacist's contact information:                                                      It was a pleasure seeing you today!  Please feel free to contact me with any questions or concerns you have.      Krista Mendoza , Pharm D  [phone] (phone)  [phone] (pager)  Medication Therapy Management Pharmacist     You may receive a survey about the MTM services you received.  I would appreciate your feedback to help me serve you better in the future. Please fill it out and return it when you can. Your comments will be anonymous.

## 2018-08-06 NOTE — MR AVS SNAPSHOT
After Visit Summary   8/6/2018    Derrick Dela Cruz    MRN: [id number]           Patient Information     Date Of Birth          1940        Visit Information        Provider Department      8/6/2018 1:00 PM Krista Mendoza, [facility]        Today's Diagnoses     Insomnia, unspecified type    -  1      Care Instructions    Recommendations from today's MTM visit:                                                      1. Due for A1c lab      Next MTM visit: 3 months    To schedule another MTM appointment, please call the clinic directly or you may call the MTM scheduling line at [phone] or toll-free at [phone].     My Clinical Pharmacist's contact information:                                                      It was a pleasure seeing you today!  Please feel free to contact me with any questions or concerns you have.      Krista Mendoza , Pharm D  [phone] (phone)  [phone] (pager)  Medication Therapy Management Pharmacist     You may receive a survey about the MTM services you received.  I would appreciate your feedback to help me serve you better in the future. Please fill it out and return it when you can. Your comments will be anonymous.                      Follow-ups after your visit        Who to contact     If you have questions or need follow up information about today's clinic visit or your schedule please contact [facility] directly at [phone].  Normal or non-critical lab and imaging results will be communicated to you by MyChart, letter or phone within 4 business days after the clinic has received the results. If you do not hear from us within 7 days, please contact the clinic through MyChart or phone. If you have a critical or abnormal lab result, we will notify you by phone as soon as possible.  Submit refill requests through [employer] or call your pharmacy and they will forward the refill request to us. Please allow 3 business days for your  refill to be completed.          Additional Information About Your Visit        Care EveryWhere ID     This is your Care EveryWhere ID. This could be used by other organizations to access your [place] medical records  [id number]        Your Vitals Were     Pulse BMI (Body Mass Index)                66 25.62 kg/m2           Blood Pressure from Last 3 Encounters:   08/06/18 123/72   05/08/18 123/67   01/18/18 116/68    Weight from Last 3 Encounters:   08/06/18 168 lb 8 oz (76.4 kg)   05/08/18 175 lb 14.4 oz (79.8 kg)   01/18/18 173 lb (78.5 kg)              Today, you had the following     No orders found for display         Today's Medication Changes          These changes are accurate as of 8/6/18  1:31 PM.  If you have any questions, ask your nurse or doctor.               These medicines have changed or have updated prescriptions.        Dose/Directions    melatonin 5 MG Caps   This may have changed:    - medication strength  - how much to take   Used for:  Insomnia, unspecified type        Dose:  2 capsule   Take 2 capsules by mouth daily   Refills:  0            Where to get your medicines      Some of these will need a paper prescription and others can be bought over the counter.  Ask your nurse if you have questions.     You don't need a prescription for these medications     melatonin 5 MG Caps                Primary Care Provider Office Phone # Fax #    Yin Jaramillo [phone]       [facility] [address] [place] [address]        Equal Access to Services     GIRISH ALEJANDRO AH: Hadii fredrick Rowland, gabriela sherman, qaybta kaalmada norman, nichol dan. So [facility] [phone].    ATENCIÓN: Si habla español, tiene a aguilera disposición servicios gratuitos de asistencia lingüística. Llame al [phone].    We comply with applicable federal civil rights laws and [place] laws. We do not discriminate on the basis of race, color, national  origin, age, disability, sex, sexual orientation, or gender identity.            Thank you!     Thank you for choosing [facility]  for your care. Our goal is always to provide you with excellent care. Hearing back from our patients is one way we can continue to improve our services. Please take a few minutes to complete the written survey that you may receive in the mail after your visit with us. Thank you!             Your Updated Medication List - Protect others around you: Learn how to safely use, store and throw away your medicines at www.disposemymeds.org.          This list is accurate as of 8/6/18  1:31 PM.  Always use your most recent med list.                   Brand Name Dispense Instructions for use Diagnosis    acetaminophen 500 MG tablet    TYLENOL     Take 1-2 tablets by mouth as needed.        amLODIPine 5 MG tablet    NORVASC     Take 5 mg by mouth daily        aspirin 81 MG tablet      1 TABLET DAILY        atorvastatin 10 MG tablet    LIPITOR     Take 10 mg by mouth daily        benazepril 40 MG tablet    LOTENSIN     Take 40 mg by mouth daily        * calcitRIOL 0.25 MCG capsule    ROCALTROL     Take 0.25 mcg by mouth        * calcitRIOL 0.25 MCG capsule    ROCALTROL     Take 0.25 mcg by mouth daily        calcium carbonate 500 MG chewable tablet    TUMS     Take 1 chew tab by mouth daily as needed for heartburn        cinnamon 500 MG Caps      Take 1 capsule by mouth 2 times daily        furosemide 20 MG tablet    LASIX     Take 1 tablet by mouth daily.        glipiZIDE 2.5 MG 24 hr tablet    GLUCOTROL XL     Take 5 mg by mouth daily        linagliptin 5 MG Tabs tablet    TRADJENTA     Take 5 mg by mouth daily        Lysine 1000 MG Tabs      Take 1,000 mg by mouth as needed (as needed for canker sores)        melatonin 5 MG Caps      Take 2 capsules by mouth daily    Insomnia, unspecified type       ranitidine 150 MG tablet    ZANTAC     Take 75 mg by mouth At Bedtime        sodium  bicarbonate 325 MG tablet      Take 325 mg by mouth 2 times daily        tadalafil 5 MG tablet    CIALIS    30 tablet    Take 1 tablet (5 mg) by mouth daily Never use with nitroglycerin, terazosin or doxazosin.        TRUETEST test strip   Generic drug:  blood glucose monitoring      daily        valACYclovir 500 MG tablet    VALTREX    90 tablet    Take 1 tablet (500 mg) by mouth daily        * Notice:  This list has 2 medication(s) that are the same as other medications prescribed for you. Read the directions carefully, and ask your doctor or other care provider to review them with you.

## 2018-10-22 ENCOUNTER — TELEPHONE (OUTPATIENT)
Dept: PHARMACY | Facility: CLINIC | Age: 78
End: 2018-10-22

## 2018-11-08 ENCOUNTER — OFFICE VISIT (OUTPATIENT)
Dept: PHARMACY | Facility: CLINIC | Age: 78
End: 2018-11-08
Payer: COMMERCIAL

## 2018-11-08 VITALS
WEIGHT: 172 LBS | SYSTOLIC BLOOD PRESSURE: 133 MMHG | BODY MASS INDEX: 26.15 KG/M2 | DIASTOLIC BLOOD PRESSURE: 74 MMHG | HEART RATE: 57 BPM

## 2018-11-08 DIAGNOSIS — K21.9 GASTROESOPHAGEAL REFLUX DISEASE WITHOUT ESOPHAGITIS: ICD-10-CM

## 2018-11-08 DIAGNOSIS — G47.00 INSOMNIA, UNSPECIFIED TYPE: ICD-10-CM

## 2018-11-08 DIAGNOSIS — R21 RASH: ICD-10-CM

## 2018-11-08 DIAGNOSIS — E78.5 HYPERLIPIDEMIA LDL GOAL <100: ICD-10-CM

## 2018-11-08 DIAGNOSIS — N52.9 ERECTILE DYSFUNCTION, UNSPECIFIED ERECTILE DYSFUNCTION TYPE: ICD-10-CM

## 2018-11-08 DIAGNOSIS — I10 ESSENTIAL HYPERTENSION WITH GOAL BLOOD PRESSURE LESS THAN 140/90: ICD-10-CM

## 2018-11-08 DIAGNOSIS — N18.30 CHRONIC KIDNEY DISEASE, STAGE III (MODERATE) (H): ICD-10-CM

## 2018-11-08 DIAGNOSIS — M25.551 HIP PAIN, RIGHT: ICD-10-CM

## 2018-11-08 DIAGNOSIS — E11.9 TYPE 2 DIABETES MELLITUS WITHOUT COMPLICATION, WITHOUT LONG-TERM CURRENT USE OF INSULIN (H): Primary | ICD-10-CM

## 2018-11-08 PROCEDURE — 99607 MTMS BY PHARM ADDL 15 MIN: CPT | Performed by: PHARMACIST

## 2018-11-08 PROCEDURE — 99606 MTMS BY PHARM EST 15 MIN: CPT | Performed by: PHARMACIST

## 2018-11-08 RX ORDER — CLOTRIMAZOLE AND BETAMETHASONE DIPROPIONATE 10; .64 MG/G; MG/G
CREAM TOPICAL 2 TIMES DAILY
COMMUNITY

## 2018-11-08 NOTE — PATIENT INSTRUCTIONS
Recommendations from today's MTM visit:                                                    MTM (medication therapy management) is a service provided by a clinical pharmacist designed to help you get the most of out of your medicines.   Today we reviewed what your medicines are for, how to know if they are working, that your medicines are safe and how to make your medicine regimen as easy as possible.     1.  Aspirin 81 mg daily - discuss ongoing need with your doctor if you are thinking about quitting    2.  Glucosamine 1500 mg for joint pain - it may take 2-3 months to be fully effective for the joint pain    3.  Turmeric 500 mg twice daily, this has been shown to be effective for arthritis/joint pain    Next MTM visit: 3 month    To schedule another MTM appointment, please call the clinic directly or you may call the MTM scheduling line at [phone] or toll-free at [phone].     My Clinical Pharmacist's contact information:                                                      It was a pleasure talking with you today!  Please feel free to contact me with any questions or concerns you have.      Krista Mendoza , Pharm D  [phone] (phone)  [phone] (pager)  Medication Therapy Management Pharmacist
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
## 2018-11-08 NOTE — MR AVS SNAPSHOT
After Visit Summary   11/8/2018    Derrick Dela Cruz    MRN: [id number]           Patient Information     Date Of Birth          1940        Visit Information        Provider Department      11/8/2018 2:30 PM Krista Mendoza, [facility]        Care Instructions    Recommendations from today's MTM visit:                                                    MTM (medication therapy management) is a service provided by a clinical pharmacist designed to help you get the most of out of your medicines.   Today we reviewed what your medicines are for, how to know if they are working, that your medicines are safe and how to make your medicine regimen as easy as possible.     1.  Aspirin 81 mg daily - discuss ongoing need with your doctor if you are thinking about quitting    2.  Glucosamine 1500 mg for joint pain - it may take 2-3 months to be fully effective for the joint pain    3.  Turmeric 500 mg twice daily, this has been shown to be effective for arthritis/joint pain    Next MTM visit: 3 month    To schedule another MTM appointment, please call the clinic directly or you may call the MTM scheduling line at [phone] or toll-free at [phone].     My Clinical Pharmacist's contact information:                                                      It was a pleasure talking with you today!  Please feel free to contact me with any questions or concerns you have.      Krista Mendoza , Pharm D  [phone] (phone)  [phone] (pager)  Medication Therapy Management Pharmacist                   Follow-ups after your visit        Who to contact     If you have questions or need follow up information about today's clinic visit or your schedule please contact [facility] directly at [phone].  Normal or non-critical lab and imaging results will be communicated to you by MyChart, letter or phone within 4 business days after the clinic has received the results. If you do not hear  from us within 7 days, please contact the clinic through [employer] or phone. If you have a critical or abnormal lab result, we will notify you by phone as soon as possible.  Submit refill requests through [employer] or call your pharmacy and they will forward the refill request to us. Please allow 3 business days for your refill to be completed.          Additional Information About Your Visit        Care EveryWhere ID     This is your Care EveryWhere ID. This could be used by other organizations to access your [place] medical records  [id number]        Your Vitals Were     Pulse BMI (Body Mass Index)                57 26.15 kg/m2           Blood Pressure from Last 3 Encounters:   11/08/18 133/74   08/06/18 123/72   05/08/18 123/67    Weight from Last 3 Encounters:   11/08/18 172 lb (78 kg)   08/06/18 168 lb 8 oz (76.4 kg)   05/08/18 175 lb 14.4 oz (79.8 kg)              Today, you had the following     No orders found for display       Primary Care Provider Office Phone # Fax #    Yin RONY Jaramillo [phone]       [facility] [address] [place] [address]        Equal Access to Services     GIRISH ALEJANDRO : Hadii aad ku hadasho Sodhruvali, waaxda luqadaha, qaybta kaalmada adeegyada, nichol dan. So [facility] [phone].    ATENCIÓN: Si habla español, tiene a aguilera disposición servicios gratuitos de asistencia lingüística. [place]lin[place] al [phone].    We comply with applicable federal civil rights laws and [place] laws. We do not discriminate on the basis of race, color, national origin, age, disability, sex, sexual orientation, or gender identity.            Thank you!     Thank you for choosing [facility]  for your care. Our goal is always to provide you with excellent care. Hearing back from our patients is one way we can continue to improve our services. Please take a few minutes to complete the written survey that you may receive in the mail after your  visit with us. Thank you!             Your Updated Medication List - Protect others around you: Learn how to safely use, store and throw away your medicines at www.disposemymeds.org.          This list is accurate as of 11/8/18  3:05 PM.  Always use your most recent med list.                   Brand Name Dispense Instructions for use Diagnosis    acetaminophen 500 MG tablet    TYLENOL     Take 1-2 tablets by mouth as needed.        amLODIPine 5 MG tablet    NORVASC     Take 5 mg by mouth daily        aspirin 81 MG tablet      1 TABLET DAILY        atorvastatin 10 MG tablet    LIPITOR     Take 10 mg by mouth daily        benazepril 40 MG tablet    LOTENSIN     Take 40 mg by mouth daily        calcitRIOL 0.25 MCG capsule    ROCALTROL     Take 0.25 mcg by mouth daily        calcium carbonate 500 MG chewable tablet    TUMS     Take 1 chew tab by mouth daily as needed for heartburn        cinnamon 500 MG Caps      Take 1 capsule by mouth 2 times daily        clotrimazole-betamethasone cream    LOTRISONE     Apply topically 2 times daily        furosemide 20 MG tablet    LASIX     Take 1 tablet by mouth daily.        glipiZIDE 2.5 MG 24 hr tablet    GLUCOTROL XL     Take 5 mg by mouth daily        GLUCOSAMINE 1500 COMPLEX PO      Take 1 tablet by mouth daily        linagliptin 5 MG Tabs tablet    TRADJENTA     Take 5 mg by mouth daily        Lysine 1000 MG Tabs      Take 1,000 mg by mouth as needed (as needed for canker sores)        melatonin 5 MG Caps      Take 2 capsules by mouth daily    Insomnia, unspecified type       ranitidine 150 MG tablet    ZANTAC     Take 75 mg by mouth At Bedtime        sodium bicarbonate 325 MG tablet      Take 325 mg by mouth 2 times daily        tadalafil 5 MG tablet    CIALIS    30 tablet    Take 1 tablet (5 mg) by mouth daily Never use with nitroglycerin, terazosin or doxazosin.        TRUETEST test strip   Generic drug:  blood glucose monitoring      daily        valACYclovir 500 MG  tablet    VALTREX    90 tablet    Take 1 tablet (500 mg) by mouth daily

## 2019-02-11 ENCOUNTER — TELEPHONE (OUTPATIENT)
Dept: PHARMACY | Facility: CLINIC | Age: 79
End: 2019-02-11

## 2025-06-26 ENCOUNTER — TRANSCRIBE ORDERS (OUTPATIENT)
Dept: OTHER | Age: 85
End: 2025-06-26

## 2025-06-26 DIAGNOSIS — R07.9 CHEST PAIN, UNSPECIFIED TYPE: Primary | ICD-10-CM

## 2025-07-08 ENCOUNTER — TRANSCRIBE ORDERS (OUTPATIENT)
Dept: OTHER | Age: 85
End: 2025-07-08